# Patient Record
(demographics unavailable — no encounter records)

---

## 2025-06-11 NOTE — HISTORY OF PRESENT ILLNESS
[FreeTextEntry8] : Patient presents to [facility] care. 74 yo M w/ PMHx HTN and Lyme's (10 years ago) He noticed tick bites a month ago.  Since then, his leg and joints hurt. Feels like he has a hernia near the right leg.   Sees cardiology for a murmur. Had a colonoscopy 3-4 years ago.  Was found to have one polyp, but does not remember when to follow up.

## 2025-06-11 NOTE — HEALTH RISK ASSESSMENT
[Yes] : Yes [Monthly or less (1 pt)] : Monthly or less (1 point) [1 or 2 (0 pts)] : 1 or 2 (0 points) [Never (0 pts)] : Never (0 points) [No] : In the past 12 months have you used drugs other than those required for medical reasons? No [No falls in past year] : Patient reported no falls in the past year [Little interest or pleasure doing things] : 1) Little interest or pleasure doing things [Feeling down, depressed, or hopeless] : 2) Feeling down, depressed, or hopeless [0] : 2) Feeling down, depressed, or hopeless: Not at all (0) [PHQ-2 Negative - No further assessment needed] : PHQ-2 Negative - No further assessment needed [Former] : Former [15-19] : 15-19 [< 15 Years] : < 15 Years [de-identified] : cardiology, GI [[id number]] : 0 [de-identified] : quit 2019; <1PPD for 20 years

## 2025-06-11 NOTE — PHYSICAL EXAM
[No Acute Distress] : no acute distress [Well Nourished] : well nourished [Well-Appearing] : well-appearing [Well Developed] : well developed [Normal Sclera/Conjunctiva] : normal sclera/conjunctiva [Normal Outer Ear/Nose] : the outer ears and nose were normal in appearance [No Respiratory Distress] : no respiratory distress  [No Accessory Muscle Use] : no accessory muscle use [Clear to Auscultation] : lungs were clear to auscultation bilaterally [Normal Rate] : normal rate  [Regular Rhythm] : with a regular rhythm [No Murmur] : no murmur heard [Normal S1, S2] : normal S1 and S2 [No Edema] : there was no peripheral edema [Soft] : abdomen soft [Non Tender] : non-tender [Normal Bowel Sounds] : normal bowel sounds [Normal Gait] : normal gait [Normal Affect] : the affect was normal [Normal Insight/Judgement] : insight and judgment were intact [No Hernias] : no hernias [No Spinal Tenderness] : no spinal tenderness [de-identified] : pain with internal rotation of the right hip

## 2025-06-11 NOTE — PLAN
[FreeTextEntry1] : Patient presents to establish care.  Routine labs ordered (CBC, CMP, TSH, Lipids, U/a).  #HTN BP at goal C/w Amlodipine 10mg qd Will do general labs above  #Tick bite #joint pains Needs tick panel to r/o tick disease Joint pain may also be associated with osteoarthritis Will do b/l hip xray May start PT